# Patient Record
Sex: MALE | Race: WHITE | Employment: FULL TIME | URBAN - METROPOLITAN AREA
[De-identification: names, ages, dates, MRNs, and addresses within clinical notes are randomized per-mention and may not be internally consistent; named-entity substitution may affect disease eponyms.]

---

## 2019-07-12 ENCOUNTER — OFFICE VISIT (OUTPATIENT)
Dept: FAMILY MEDICINE CLINIC | Age: 24
End: 2019-07-12
Payer: COMMERCIAL

## 2019-07-12 VITALS
BODY MASS INDEX: 23.51 KG/M2 | DIASTOLIC BLOOD PRESSURE: 88 MMHG | TEMPERATURE: 98.5 F | RESPIRATION RATE: 15 BRPM | SYSTOLIC BLOOD PRESSURE: 138 MMHG | WEIGHT: 173.6 LBS | HEIGHT: 72 IN | HEART RATE: 77 BPM | OXYGEN SATURATION: 99 %

## 2019-07-12 DIAGNOSIS — Z23 NEED FOR VACCINATION: ICD-10-CM

## 2019-07-12 DIAGNOSIS — Z13.220 SCREENING CHOLESTEROL LEVEL: ICD-10-CM

## 2019-07-12 DIAGNOSIS — Z00.00 WELL ADULT EXAM: Primary | ICD-10-CM

## 2019-07-12 DIAGNOSIS — Z11.1 SCREENING EXAMINATION FOR PULMONARY TUBERCULOSIS: ICD-10-CM

## 2019-07-12 DIAGNOSIS — Z72.51 HIGH RISK HETEROSEXUAL BEHAVIOR: ICD-10-CM

## 2019-07-12 PROCEDURE — 90471 IMMUNIZATION ADMIN: CPT | Performed by: FAMILY MEDICINE

## 2019-07-12 PROCEDURE — 99385 PREV VISIT NEW AGE 18-39: CPT | Performed by: FAMILY MEDICINE

## 2019-07-12 PROCEDURE — 90715 TDAP VACCINE 7 YRS/> IM: CPT | Performed by: FAMILY MEDICINE

## 2019-07-12 PROCEDURE — 86580 TB INTRADERMAL TEST: CPT | Performed by: FAMILY MEDICINE

## 2019-07-12 SDOH — HEALTH STABILITY: MENTAL HEALTH: HOW OFTEN DO YOU HAVE A DRINK CONTAINING ALCOHOL?: 2-3 TIMES A WEEK

## 2019-07-12 SDOH — HEALTH STABILITY: MENTAL HEALTH: HOW MANY STANDARD DRINKS CONTAINING ALCOHOL DO YOU HAVE ON A TYPICAL DAY?: 1 OR 2

## 2019-07-12 ASSESSMENT — ENCOUNTER SYMPTOMS
COLOR CHANGE: 0
BLOOD IN STOOL: 0
EYE PAIN: 0
SHORTNESS OF BREATH: 0
CHOKING: 0
NAUSEA: 0
ABDOMINAL DISTENTION: 0
APNEA: 0
COUGH: 0
ABDOMINAL PAIN: 0
EYE DISCHARGE: 0
SORE THROAT: 0
VOMITING: 0
DIARRHEA: 0
CHEST TIGHTNESS: 0
SINUS PRESSURE: 0
RHINORRHEA: 0
WHEEZING: 0
PHOTOPHOBIA: 0
CONSTIPATION: 0

## 2019-07-12 ASSESSMENT — PATIENT HEALTH QUESTIONNAIRE - PHQ9
1. LITTLE INTEREST OR PLEASURE IN DOING THINGS: 0
SUM OF ALL RESPONSES TO PHQ QUESTIONS 1-9: 0
2. FEELING DOWN, DEPRESSED OR HOPELESS: 0
SUM OF ALL RESPONSES TO PHQ QUESTIONS 1-9: 0
SUM OF ALL RESPONSES TO PHQ9 QUESTIONS 1 & 2: 0

## 2019-07-12 NOTE — PROGRESS NOTES
Subjective:      Patient ID: Arcenio Smith is a 21 y.o. male who presents today for:     Chief Complaint   Patient presents with    New Patient     Patient present today with establishing care. HPI     Patient presents to establish care and have routine well visit for workplace.      Past Medical History:   Diagnosis Date    Nondisplaced fracture of shaft of right clavicle 7/30/2013    Sprain of wrist, left 5/25/2012     Past Surgical History:   Procedure Laterality Date    CIRCUMCISION  1995    WISDOM TOOTH EXTRACTION  07/2017     Family History   Problem Relation Age of Onset    Diabetes Maternal Grandmother     High Blood Pressure Maternal Grandmother     Cancer Maternal Grandmother         unknown primary    Arthritis Paternal Grandmother     No Known Problems Mother     No Known Problems Father     Thyroid Disease Sister     Depression Paternal Grandfather     Stroke Paternal Grandfather 67    Cancer Paternal Grandfather         unknown primary     Social History     Socioeconomic History    Marital status: Single     Spouse name: Not on file    Number of children: Not on file    Years of education: Not on file    Highest education level: Master's degree (e.g., MA, MS, [employer], MEd, MSW, ANTIONE)   Occupational History    Occupation: teacher - [employer]     Comment: [employer]   Social Needs    Financial resource strain: Not on file    Food insecurity:     Worry: Not on file     Inability: Not on file   [employer] needs:     Medical: Not on file     Non-medical: Not on file   Tobacco Use    Smoking status: Never Smoker    Smokeless tobacco: Never Used   Substance and Sexual Activity    Alcohol use: Yes     Frequency: 2-3 times a week     Drinks per session: 1 or 2     Binge frequency: Never     Comment: 5/6 drinks once a month    Drug use: Yes     Types: Marijuana     Comment: episodic use    Sexual activity: Yes     Partners: Female     Birth control/protection: Condom Lifestyle    Physical activity:     Days per week: Not on file     Minutes per session: Not on file    Stress: Not on file   Relationships    Social connections:     Talks on phone: Not on file     Gets together: Not on file     Attends [employer] service: Not on file     Active member of club or organization: Not on file     Attends meetings of clubs or organizations: Not on file     Relationship status: Not on file    Intimate partner violence:     Fear of current or ex partner: Not on file     Emotionally abused: Not on file     Physically abused: Not on file     Forced sexual activity: Not on file   Other Topics Concern    Not on file   Social History Narrative    Moving to [facility], going to be living with a friend        No pets        Hobbies: soccer, basketball, music     No current outpatient medications on file prior to visit. No current facility-administered medications on file prior to visit. Allergies:  Patient has no known allergies. Review of Systems   Constitutional: Negative for appetite change, chills, diaphoresis, fatigue, fever and unexpected weight change. HENT: Negative for congestion, ear pain, postnasal drip, rhinorrhea, sinus pressure and sore throat. Eyes: Negative for photophobia, pain, discharge and visual disturbance. Respiratory: Negative for apnea, cough, choking, chest tightness, shortness of breath and wheezing. Cardiovascular: Negative for chest pain, palpitations and leg swelling. No orthopnea, No PND   Gastrointestinal: Negative for abdominal distention, abdominal pain, blood in stool, constipation, diarrhea, nausea and vomiting. No heartburn, No melena   Endocrine: Negative for cold intolerance, heat intolerance, polydipsia, polyphagia and polyuria. Genitourinary: Negative for dysuria, flank pain, frequency, hematuria and urgency. Musculoskeletal: Negative for arthralgias, gait problem and myalgias.    Skin: Negative for color change supraclavicular adenopathy present. Neurological: He is alert and oriented to person, place, and time. He has normal strength. No cranial nerve deficit or sensory deficit. He exhibits normal muscle tone. Skin: Skin is warm. No rash noted. He is not diaphoretic. No cyanosis. Nails show no clubbing. Psychiatric: He has a normal mood and affect. His behavior is normal.        Ortho Exam (If Applicable)      Assessment & Plan:      1. Well adult exam  26-year-old male with exam as listed above    2. Screening cholesterol level    - Lipid Panel; Future    3. High risk heterosexual behavior  I stressed with patient the need for condom use with every sexual encounter. I reviewed with patient that condoms can break and the only 100% method of preventing unintended pregnancy and STIs is abstinence. Patient was educated that monogamy should be practiced if he/she chose to be sexually active. Patient was also educated that hormonal contraception does not protect against STI's and is not 100% effective, and that unintended pregnancies can occur even with regular use as directed. - Hepatitis B Surface Antigen; Future  - Hepatitis C Antibody; Future  - HIV-1,2 Combo Ag/Ab By ILA, Reflexive Panel; Future  - RPR with FTA Relex; Future  - TRICHOMONAS VAGINALIS RNA, QUAL TMA, PAP VIA; Future  - C.trachomatis N.gonorrhoeae DNA, Urine; Future    4. Need for vaccination    - Tdap (age 6y and older) IM (Boostrix)    5.  Screening examination for pulmonary tuberculosis    - Mantoux testing      Modified Medications    No medications on file          New Prescriptions    No medications on file          Medications Discontinued During This Encounter   Medication Reason    piroxicam (FELDENE) 20 MG capsule Therapy completed       Return in about 1 year (around 7/12/2020) for Annual Fabricio Bates MD

## 2019-07-14 LAB
INDURATION: NORMAL
TB SKIN TEST: NEGATIVE

## 2019-07-22 ENCOUNTER — HOSPITAL ENCOUNTER (OUTPATIENT)
Dept: LAB | Age: 24
Discharge: HOME | End: 2019-07-22
Payer: COMMERCIAL

## 2019-07-22 ENCOUNTER — TELEPHONE (OUTPATIENT)
Dept: FAMILY MEDICINE CLINIC | Age: 24
End: 2019-07-22

## 2019-07-22 DIAGNOSIS — Z72.51 HIGH RISK HETEROSEXUAL BEHAVIOR: ICD-10-CM

## 2019-07-22 DIAGNOSIS — Z13.220 SCREENING CHOLESTEROL LEVEL: ICD-10-CM

## 2019-07-22 LAB
CHOLESTEROL, TOTAL: 159 MG/DL (ref 0–199)
HDLC SERPL-MCNC: 61 MG/DL (ref 40–59)
HEPATITIS B SURFACE ANTIGEN INTERPRETATION: NORMAL
HEPATITIS C ANTIBODY INTERPRETATION: NORMAL
LDL CHOLESTEROL CALCULATED: 85 MG/DL (ref 0–129)
TRIGL SERPL-MCNC: 65 MG/DL (ref 0–150)

## 2019-07-22 PROCEDURE — 36415 COLL VENOUS BLD VENIPUNCTURE: CPT

## 2019-07-22 PROCEDURE — 87340 HEPATITIS B SURFACE AG IA: CPT

## 2019-07-22 PROCEDURE — 87491 CHLMYD TRACH DNA AMP PROBE: CPT

## 2019-07-22 PROCEDURE — 87661 TRICHOMONAS VAGINALIS AMPLIF: CPT

## 2019-07-22 PROCEDURE — 86592 SYPHILIS TEST NON-TREP QUAL: CPT

## 2019-07-22 PROCEDURE — 87389 HIV-1 AG W/HIV-1&-2 AB AG IA: CPT

## 2019-07-22 PROCEDURE — 86803 HEPATITIS C AB TEST: CPT

## 2019-07-22 PROCEDURE — 80061 LIPID PANEL: CPT

## 2019-07-22 PROCEDURE — 87591 N.GONORRHOEAE DNA AMP PROB: CPT

## 2019-07-23 LAB — RPR: NORMAL

## 2019-07-24 LAB — HIV 1,2 COMBO ANTIGEN/ANTIBODY: NEGATIVE

## 2019-07-25 LAB
SPECIMEN SOURCE: NORMAL
T. VAGINALIS AMPLIFIED: NEGATIVE

## 2019-07-29 LAB
C. TRACHOMATIS DNA ,URINE: NEGATIVE
N. GONORRHOEAE DNA, URINE: NEGATIVE